# Patient Record
Sex: FEMALE | Race: WHITE | NOT HISPANIC OR LATINO | URBAN - METROPOLITAN AREA
[De-identification: names, ages, dates, MRNs, and addresses within clinical notes are randomized per-mention and may not be internally consistent; named-entity substitution may affect disease eponyms.]

---

## 2017-12-05 ENCOUNTER — IMPORTED ENCOUNTER (OUTPATIENT)
Dept: URBAN - METROPOLITAN AREA CLINIC 31 | Facility: CLINIC | Age: 47
End: 2017-12-05

## 2017-12-05 PROBLEM — H50.21: Noted: 2017-12-05

## 2017-12-05 PROCEDURE — 92015 DETERMINE REFRACTIVE STATE: CPT

## 2017-12-05 PROCEDURE — 92014 COMPRE OPH EXAM EST PT 1/>: CPT

## 2017-12-05 NOTE — PATIENT DISCUSSION
1.  Refractive error Annual Good ocular health documented. Discussed options of glasses contacts or refractive surgery. Discussed importance of annual eye exams. 2.  Hypertropia OD: No prism at this time.

## 2019-01-16 ENCOUNTER — IMPORTED ENCOUNTER (OUTPATIENT)
Dept: URBAN - METROPOLITAN AREA CLINIC 31 | Facility: CLINIC | Age: 49
End: 2019-01-16

## 2019-01-16 PROCEDURE — 92310 CONTACT LENS FITTING OU: CPT

## 2019-01-16 NOTE — PATIENT DISCUSSION
Refractive error Insertion removal and care regimen successfully completed today. and Dispense trial contacts OU. To discontinue and call if any problems. Also dispensed 1.75 trial lens for OS pt will try it and when she returns in a few weeks let us know what works best for her.

## 2019-02-06 ENCOUNTER — IMPORTED ENCOUNTER (OUTPATIENT)
Dept: URBAN - METROPOLITAN AREA CLINIC 31 | Facility: CLINIC | Age: 49
End: 2019-02-06

## 2019-02-06 NOTE — PATIENT DISCUSSION
Refractive error Continue present contact lens modality. Stop/wear and call if any redness pain or decrease in vision occur. Changed CL OD to -0.25 w/ High Add pt noticed distance vision was better. Will give final Rx and instructed to call for next eye exam when she is back in town.

## 2019-03-19 ENCOUNTER — IMPORTED ENCOUNTER (OUTPATIENT)
Dept: URBAN - METROPOLITAN AREA CLINIC 31 | Facility: CLINIC | Age: 49
End: 2019-03-19

## 2019-03-19 NOTE — PATIENT DISCUSSION
Refractive error Continue present contact lens modality. Stop/wear and call if any redness pain or decrease in vision occur. Contact Lens Can't get clear vision with astigmatism OD and amblyopia OS while treating presbyopia. Will try glasses with clip-on sunglasses.

## 2022-04-02 ASSESSMENT — VISUAL ACUITY
OS_CC: J114''
OD_SC: 20/25
OD_CC: J114''
OD_SC: 20/30+2
OD_CC: J1
OS_SC: 20/30
OS_SC: 20/25
OD_CC: 20/30-1
OD_CC: 20/30
OD_CC: J114''
OS_SC: 20/30+2
OS_CC: J1
OD_CC: J314''
OS_CC: J514''
OS_CC: J314''
OS_CC: 20/30
OD_SC: 20/30
OS_CC: 20/30

## 2022-04-02 ASSESSMENT — TONOMETRY
OD_IOP_MMHG: 15
OS_IOP_MMHG: 14

## 2022-07-30 ENCOUNTER — TELEPHONE ENCOUNTER (OUTPATIENT)
Age: 52
End: 2022-07-30

## 2022-07-31 ENCOUNTER — TELEPHONE ENCOUNTER (OUTPATIENT)
Age: 52
End: 2022-07-31